# Patient Record
Sex: MALE | Race: WHITE | NOT HISPANIC OR LATINO | Employment: FULL TIME | URBAN - METROPOLITAN AREA
[De-identification: names, ages, dates, MRNs, and addresses within clinical notes are randomized per-mention and may not be internally consistent; named-entity substitution may affect disease eponyms.]

---

## 2023-07-24 ENCOUNTER — TRANSCRIBE ORDERS (OUTPATIENT)
Dept: ADMINISTRATIVE | Facility: HOSPITAL | Age: 58
End: 2023-07-24
Payer: COMMERCIAL

## 2023-07-24 DIAGNOSIS — R20.2 PARESTHESIA: Primary | ICD-10-CM

## 2023-08-02 ENCOUNTER — HOSPITAL ENCOUNTER (OUTPATIENT)
Dept: INFUSION THERAPY | Facility: HOSPITAL | Age: 58
Discharge: HOME | End: 2023-08-02
Admitting: PSYCHIATRY & NEUROLOGY
Payer: COMMERCIAL

## 2023-08-02 DIAGNOSIS — R20.2 PARESTHESIA: ICD-10-CM

## 2023-08-02 PROCEDURE — 95911 NRV CNDJ TEST 9-10 STUDIES: CPT | Performed by: PSYCHIATRY & NEUROLOGY

## 2023-08-02 PROCEDURE — 95911 NRV CNDJ TEST 9-10 STUDIES: CPT

## 2023-08-02 PROCEDURE — 95886 MUSC TEST DONE W/N TEST COMP: CPT

## 2023-08-02 PROCEDURE — 95886 MUSC TEST DONE W/N TEST COMP: CPT | Performed by: PSYCHIATRY & NEUROLOGY

## 2023-08-02 NOTE — PROCEDURES
EMG and Nerve Conduction Studies    I.      Instrument used: Neuromax 1002  II.     Please see data sheets for tabular summary of NCS and details on methods, temperatures and lab standards.   III.    EMG muscles tested for upper extremity studies include the deltoid, biceps, triceps, pronator teres, extensor digitorum communis, first dorsal interosseous and abductor pollicis brevis.    IV.   EMG muscles tested for lower extremity studies include the vastus lateralis, tibialis anterior, peroneus longus, medial gastrocnemius and extensor digitorum brevis.    V.    Additional muscles tested as needed.  Paraspinal muscles tested as needed.   VI.   Please see data sheets for tabular summary of EMG findings.   VII. The complete report includes the data sheets.      Indication: Bilateral hand numbness and tingling worse  History: 57-year-old male with numbness and tingling in both hands digits 1-3 and it is worse at night.  He wakes up with numbness all the way up to his shoulders.  He denies diabetes or thyroid disease.  A previous hemoglobin A1c in 2021 was 5.9% consistent with prediabetes.  The patient has history of working as a .  Currently he works maintenance.      Ht: 72 inches  Wt: 223 pounds  HbA1C:   Lab Results   Component Value Date    HGBA1C 5.9 (H) 05/18/2021     TSH: No results found for: TSH    Technical summary:  Nerve conduction studies were obtained in both arms.  Skin temperatures were at least 32 øC measured on the palms.  Needle examination was obtained on selected muscles in both arms.    Results:  1.  Absent median antidromic sensory potentials bilaterally.  2.  Normal ulnar antidromic sensory distal latencies bilaterally with a normal amplitude on the left but mildly low amplitude on the right at 12.6 æV.  3.  Normal right radial sensory distal latency and amplitude.  4.  Severely prolonged left median motor distal latency at 7.6 ms with mildly slow velocity at 46.6 m/s.  The amplitude  was low at 4.2 mV from wrist stimulation.  Very severely prolonged right median motor distal latency at 10.5 ms with minimally slow velocity at 48.2 m/s.  The amplitude was low at 2.8 mV from wrist stimulation.  5.  Normal ulnar motor conduction velocities with normal distal latencies and amplitudes bilaterally.  6.  Needle examination of selected muscles in both arms showed normal insertional activities throughout.  There was a mild increased number of large motor units in the left abductor pollicis brevis and a questionable increased number on the right.  Recruitment was diminished.  All other muscles tested showed normal insertional activities, motor units and recruitment patterns.  Specifically the pronator teres and flexor pollicis longus were normal bilaterally.    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  There is some slowing of the median motor conduction velocities in the forearms but there were no needle exam changes in the pronator teres or flexor pollicis longus to suggest a proximal median neuropathy.  In addition the right ulnar sensory potential was mildly low in amplitude.  With this finding I cannot entirely exclude a more diffuse polyneuropathy.  There was no evidence of a cervical radiculopathy on either side by this study.  The needle exam changes were consistent with the nerve conduction findings.    Ruben Rush M.D.              Dictated utilizing Dragon dictation.